# Patient Record
Sex: FEMALE | Race: BLACK OR AFRICAN AMERICAN | Employment: FULL TIME
[De-identification: names, ages, dates, MRNs, and addresses within clinical notes are randomized per-mention and may not be internally consistent; named-entity substitution may affect disease eponyms.]

---

## 2024-06-27 ENCOUNTER — HOSPITAL ENCOUNTER (OUTPATIENT)
Facility: HOSPITAL | Age: 59
Setting detail: RECURRING SERIES
Discharge: HOME | End: 2024-06-30
Payer: COMMERCIAL

## 2024-06-27 PROCEDURE — 97161 PT EVAL LOW COMPLEX 20 MIN: CPT

## 2024-06-27 PROCEDURE — 97110 THERAPEUTIC EXERCISES: CPT

## 2024-06-27 NOTE — THERAPY EVALUATION
nature. Pt presents with objective findings of back pain and pain at anterior thighs, moderately limited forward trunk lean with squatting, decreased SLS time, pain with lumbar flexion, pain with recoil after lumbar extension, positive SLR test bilaterally, and shortened hip ER's. Pt requires skilled PT to reach functional goals of sitting, dressing, bending, lifting, walking. Pt has a good rehabilitation potential due to willingness to learn and perform exercises, a strong desire to recover, but also having chronic low back pain.     Not post operative    Evaluation Complexity:  History:  LOW Complexity : Zero comorbidities / personal factors that will impact the outcome / POC; Examination:  LOW Complexity : 1-2 Standardized tests and measures addressing body structure, function, activity limitation and / or participation in recreation  ;Presentation:  LOW Complexity : Stable, uncomplicated  ;Clinical Decision Making: Oswestry Disability Index (ENRIQUE) for Low Back Pain = 51 % ; (> 41% Severe Disability) = HIGH Complexity  Overall Complexity Rating: LOW   Problem List: pain affecting function, decrease ROM, decrease strength, decrease ADL/functional abilities, and decrease activity tolerance   Treatment Plan may include any combination of the followin Therapeutic Exercise, 94523 Neuromuscular Re-Education, 07977 Manual Therapy, 78705 Therapeutic Activity, 39341 Self Care/Home Management, and 03599 Electrical Stim unattended  Patient / Family readiness to learn indicated by: asking questions, interest, return verbalization , and return demonstration   Persons(s) to be included in education: patient (P)  Barriers to Learning/Limitations: none  Measures taken if barriers to learning present: N/A  Patient Goal (s): pain free  Patient Self Reported Health Status: fair  Rehabilitation Potential: good    Short Term Goals: To be accomplished in 4 WEEKS  Pt will be proficient in their HEP and compliant with their

## 2024-06-27 NOTE — PROGRESS NOTES
PHYSICAL / OCCUPATIONAL THERAPY - DAILY TREATMENT NOTE (updated )  For Eval visit    Patient Name: Destinee Ruiz    Date: 2024    : 1965  Insurance: Payor: [employer] / Plan: [employer]ERAL / Product Type: *No Product type* /      Patient  verified yes     Visit #   Current / Total 1 16   Time   In / Out 9:01 9:42   Pain   In / Out 1 2   Subjective Functional Status/Changes: See POC     TREATMENT AREA =  see POC    OBJECTIVE      31 min   Eval - untimed                      Therapeutic Procedures:    Tx Min Billable or 1:1 Min (if diff from Tx Min) Procedure, Rationale, Specifics   10  00047 Therapeutic Exercise (timed):  increase ROM, strength, coordination, balance, and proprioception to improve patient's ability to progress to PLOF and address remaining functional goals. (see flow sheet as applicable)     Details if applicable:       10  [facility] Totals Reminder: bill using total billable min of TIMED therapeutic procedures (example: do not include dry needle or estim unattended, both untimed codes, in totals to left)  8-22 min = 1 unit; 23-37 min = 2 units; 38-52 min = 3 units; 53-67 min = 4 units; 68-82 min = 5 units   Total Total     [x]  Patient Education billed concurrently with other procedures   [x] Review HEP    [] Progressed/Changed HEP, detail:    [] Other detail:       Objective Information/Functional Measures/Assessment    See POC    Patient will continue to benefit from skilled PT / OT services to modify and progress therapeutic interventions, analyze and address functional mobility deficits, analyze and address ROM deficits, analyze and address strength deficits, analyze and address soft tissue restrictions, and analyze and cue for proper movement patterns to address functional deficits and attain remaining goals.    Progress toward goals / Updated goals:  [x]  See POC    Short Term Goals: To be accomplished in 4 WEEKS  Pt will be proficient in their HEP and compliant with their

## 2024-07-02 ENCOUNTER — TELEPHONE (OUTPATIENT)
Facility: HOSPITAL | Age: 59
End: 2024-07-02

## 2024-07-08 ENCOUNTER — TELEPHONE (OUTPATIENT)
Facility: HOSPITAL | Age: 59
End: 2024-07-08

## 2024-07-11 ENCOUNTER — HOSPITAL ENCOUNTER (OUTPATIENT)
Facility: HOSPITAL | Age: 59
Setting detail: RECURRING SERIES
Discharge: HOME | End: 2024-07-14
Payer: COMMERCIAL

## 2024-07-11 PROCEDURE — 97112 NEUROMUSCULAR REEDUCATION: CPT

## 2024-07-11 PROCEDURE — 97110 THERAPEUTIC EXERCISES: CPT

## 2024-07-11 PROCEDURE — 97535 SELF CARE MNGMENT TRAINING: CPT

## 2024-07-11 NOTE — PROGRESS NOTES
organs, bones, and openings of pelvic floor. Intro into pressure management system and role of PFM in the urinary and bowel continence system.      Pt ed provided on importance of downregulation of nervous system and promoting general relaxation and overall decrease muscle tone at rest. Discussed use of diaphragmatic breathing in all positions. Intro to how increase in stress and stressful triggers     Pt ed provided on the pressure management system and the relationship between breathing and PFM contraction/relaxation with exhale/inhale. Use of diaphragm to describe importance of 360 degrees diaphragmatic breathing to allow for expansion of abdominal cavity including gentle stretch of PFM upon inhale and return of PFM to resting states at exhale. Discussed importance of good control of pressure management in abdominal cavity to prevent excessive pressure on PFM such as breath holding patterns when performing functional transfers or lifting tasks. Discussed pressure management system's role in maintaining urinary and bowel continence.             Details if applicable:            Details if applicable:     54  [facility] Totals Reminder: bill using total billable min of TIMED therapeutic procedures (example: do not include dry needle or estim unattended, both untimed codes, in totals to left)  8-22 min = 1 unit; 23-37 min = 2 units; 38-52 min = 3 units; 53-67 min = 4 units; 68-82 min = 5 units   Total Total     TOTAL TREATMENT TIME:        54     [x]  Patient Education billed concurrently with other procedures   [x] Review HEP    [] Progressed/Changed HEP, detail:    [] Other detail:       Objective Information/Functional Measures/Assessment    Pt presents to clinic with transfer from other clinic due to recommendation for pelvic health PT services. General screen done of bilateral hips and low back performed. Education provided on pelvic health PT roles, PFM function and relationship between low back and hip muscles,

## 2024-07-24 ENCOUNTER — TELEPHONE (OUTPATIENT)
Facility: HOSPITAL | Age: 59
End: 2024-07-24

## 2024-07-25 ENCOUNTER — HOSPITAL ENCOUNTER (OUTPATIENT)
Facility: HOSPITAL | Age: 59
Setting detail: RECURRING SERIES
End: 2024-07-25
Payer: COMMERCIAL

## 2024-07-26 ENCOUNTER — APPOINTMENT (OUTPATIENT)
Facility: HOSPITAL | Age: 59
End: 2024-07-26
Payer: COMMERCIAL

## 2024-07-29 ENCOUNTER — APPOINTMENT (OUTPATIENT)
Facility: HOSPITAL | Age: 59
End: 2024-07-29
Payer: COMMERCIAL